# Patient Record
Sex: FEMALE | Race: OTHER | NOT HISPANIC OR LATINO
[De-identification: names, ages, dates, MRNs, and addresses within clinical notes are randomized per-mention and may not be internally consistent; named-entity substitution may affect disease eponyms.]

---

## 2016-12-30 NOTE — H&P ADULT. - ASSESSMENT
50 y/o female, with a PmHx of HTN, DM (on an Insulin Pump), HLD, TIA 7/16, Hypothyroidism, Endometrial Ca s/p Hysterectomy, is being admitted to telemetry for chest pain r/o acs.

## 2016-12-30 NOTE — H&P ADULT. - FAMILY HISTORY
Father  Still living? Yes, Estimated age: Age Unknown  Family history of diabetes mellitus, Age at diagnosis: Age Unknown     Mother  Still living? Yes, Estimated age: Age Unknown  Family history of diabetes mellitus, Age at diagnosis: Age Unknown  Family history of heart disease, Age at diagnosis: Age Unknown     Grandparent  Still living? No  Family history of heart disease, Age at diagnosis: Age Unknown     Uncle  Still living? Yes, Estimated age: Age Unknown  Family history of heart disease, Age at diagnosis: Age Unknown

## 2016-12-30 NOTE — ED PROVIDER NOTE - MEDICAL DECISION MAKING DETAILS
51y F presents with exertional chest pain, sob, and nausea. Multiple risk factors, consider unstable angina. Labs, CXR, and discuss with Dr. Gongora.

## 2016-12-30 NOTE — H&P ADULT. - NEGATIVE OPHTHALMOLOGIC SYMPTOMS
no loss of vision L/no diplopia/no blurred vision L/no blurred vision R/no loss of vision R/no photophobia

## 2016-12-30 NOTE — H&P ADULT. - HISTORY OF PRESENT ILLNESS
52 y/o female, with a PmHx of HTN, DM (on an Insulin Pump), HLD, TIA 7/16, Hypothyroidism, Endometrial Ca s/p Hysterectomy, presented to [facility] c/o chest pain. Pt states for the pain 3 weeks she has been having intermittent chest discomfort with associated symptoms of SOB, Cough, Dizziness and Nausea. Pt states 4 weeks ago she had the flu and 1 week after the flu started to go away she started to develop this intermittent chest discomfort. She state the pain would come and go but last night at about 2100hrs, she was getting ready to go to bed when she had a sudden onset of 4/10, "aching", non-radiating, left sided chest discomfort so she came today to the hospital for an evaluation. Pt states she had associated symptoms of SOB, HA, Dizziness and Nausea only when she would get the chest discomfort. Pt denies fever, chills, HA, blurred vision, emesis, abdominal pain, recent travel. Currently she is asymptomatic. Pt was concerned because back in 7/2016 she had a TIA. Pt thinks she had a stress test at an outpatient facility back in July 2-16. Pt appears comfortable a this time and is being admitted to telemetry for chest pain r/o acs.

## 2016-12-30 NOTE — ED PROVIDER NOTE - NS ED MD SCRIBE ATTENDING SCRIBE SECTIONS
HISTORY OF PRESENT ILLNESS/DISPOSITION/VITAL SIGNS( Pullset)/REVIEW OF SYSTEMS/PAST MEDICAL/SURGICAL/SOCIAL HISTORY/HIV/PHYSICAL EXAM

## 2016-12-30 NOTE — H&P ADULT. - RS GEN PE MLT RESP DETAILS PC
good air movement/breath sounds equal/respirations non-labored/chest wall tenderness/clear to auscultation bilaterally/airway patent

## 2016-12-30 NOTE — ED PROVIDER NOTE - DETAILS:
The scribe's documentation has been prepared under my direction and personally reviewed by me in its entirety. I confirm that the note above accurately reflects all work, treatment, procedures, and medical decision making performed by me.

## 2016-12-30 NOTE — ED PROVIDER NOTE - OBJECTIVE STATEMENT
51y F with PMHx of DM and HTN, TIA in July presents to the ED with intermittent chest discomfort with associated SOB, nausea, and dizziness x 4 weeks. Pt states chest discomfort became painful last night. Chest pain is worse with exertion and walking. Denies history of MI. FHx of MI mother. No smoking, no drinking, no drug use. NKDA. Medications: Lisinopril, Baby Aspirin, insulin. No recent travel. Past surgeries: 2004 hysterectomy. Pt describes previous TIA as left sided numbness. Denies current chest pain.

## 2016-12-30 NOTE — H&P ADULT. - PMH
DM (diabetes mellitus)    Endometrial cancer    HTN (hypertension)    Hyperlipidemia    Hypothyroid    TIA (transient ischemic attack)

## 2017-01-02 NOTE — DISCHARGE NOTE ADULT - MEDICATION SUMMARY - MEDICATIONS TO CHANGE
I will SWITCH the dose or number of times a day I take the medications listed below when I get home from the hospital:    Aspirin Enteric Coated 325 mg oral delayed release tablet  -- 1 tab(s) by mouth once a day MDD:1  -- Swallow whole.  Do not crush.  Take with food or milk.

## 2017-01-02 NOTE — DISCHARGE NOTE ADULT - PATIENT PORTAL LINK FT
“You can access the Follow[employer] Patient Portal, offered by [facility], by registering with the following website: [email or web address]”

## 2017-01-02 NOTE — DISCHARGE NOTE ADULT - MEDICATION SUMMARY - MEDICATIONS TO TAKE
I will START or STAY ON the medications listed below when I get home from the hospital:    Insulin Pump - Humalog  --     -- Indication: For DM (diabetes mellitus)    aspirin 81 mg oral tablet  -- 1 tab(s) by mouth once a day  -- Indication: For Cardiac protection    lisinopril 40 mg oral tablet  -- 1 tab(s) by mouth once a day  -- Indication: For HTN (hypertension)    Jentadueto 2.5 mg-1000 mg oral tablet  -- 1 tab(s) by mouth 2 times a day  -- Indication: For DM (diabetes mellitus)    atorvastatin 20 mg oral tablet  -- 1 tab(s) by mouth once a day  -- Indication: For Hyperlipidemia    Synthroid 200 mcg (0.2 mg) oral tablet  -- 1 tab(s) by mouth once a day  -- Indication: For Hypothyroid

## 2017-01-02 NOTE — DISCHARGE NOTE ADULT - CARE PLAN
Principal Discharge DX:	Atypical chest pain  Goal:	symptom resolution  Instructions for follow-up, activity and diet:	activity as tolerated  Secondary Diagnosis:	DM (diabetes mellitus)  Goal:	blood glucose control, management of Insulin pump  Instructions for follow-up, activity and diet:	Diabetic consistent carbohydrate diet, exercise program, Endocrinology followup  Secondary Diagnosis:	Hypothyroid  Secondary Diagnosis:	HTN (hypertension) Principal Discharge DX:	Atypical chest pain  Goal:	Follow up with primary doctor.  Instructions for follow-up, activity and diet:	Please follow up with Dr. Terrazas as outpatient. Information for Dr. Rubin provided below if you would like to follow up with his office as outpatient. Echocardiogram should be performed as outpatient.  Secondary Diagnosis:	DM (diabetes mellitus)  Goal:	blood glucose control, management of Insulin pump  Instructions for follow-up, activity and diet:	Continue diet modification. Avoid complex carbohydrates such as bread, pasta, cereal, white rice, white potatoes, etc. Avoid concentrated sugar as found in desserts, candy, soda, juice, etc. Consume a diet based on lean protein (chicken, fish) and vegetables.  Continue to use your insulin pump as advised. Follow up with the endocrinology clinic, [phone].  Secondary Diagnosis:	Hypothyroid  Instructions for follow-up, activity and diet:	Continue with synthroid.  Secondary Diagnosis:	HTN (hypertension)  Instructions for follow-up, activity and diet:	Continue medications as currently prescribed. Follow up with your PMD for further management of disease. Dash diet. Principal Discharge DX:	Atypical chest pain  Goal:	Follow up with primary doctor.  Instructions for follow-up, activity and diet:	Please follow up with Dr. Terrazas as outpatient. Information for Dr. Rubin provided below if you would like to follow up with his office as outpatient. Echocardiogram should be performed as outpatient.  Secondary Diagnosis:	DM (diabetes mellitus)  Goal:	blood glucose control, management of Insulin pump  Instructions for follow-up, activity and diet:	Continue diet modification. Avoid complex carbohydrates such as bread, pasta, cereal, white rice, white potatoes, etc. Avoid concentrated sugar as found in desserts, candy, soda, juice, etc. Consume a diet based on lean protein (chicken, fish) and vegetables.  Continue to use your insulin pump as advised. Follow up with the endocrinology clinic, [phone].  Secondary Diagnosis:	Hypothyroid  Instructions for follow-up, activity and diet:	Continue with synthroid.  Secondary Diagnosis:	HTN (hypertension)  Instructions for follow-up, activity and diet:	Continue medications as currently prescribed. Follow up with your PMD for further management of disease. Dash diet. Principal Discharge DX:	Atypical chest pain  Goal:	Follow up with primary doctor.  Instructions for follow-up, activity and diet:	Please follow up with Dr. Terrazas as outpatient. Information for Dr. Rubin provided below if you would like to follow up with his office as outpatient. Echocardiogram should be performed as outpatient.  Secondary Diagnosis:	DM (diabetes mellitus)  Goal:	blood glucose control, management of Insulin pump  Instructions for follow-up, activity and diet:	Continue diet modification. Avoid complex carbohydrates such as bread, pasta, cereal, white rice, white potatoes, etc. Avoid concentrated sugar as found in desserts, candy, soda, juice, etc. Consume a diet based on lean protein (chicken, fish) and vegetables.  Continue to use your insulin pump as advised. Follow up with the endocrinology clinic, [phone].  Secondary Diagnosis:	Hypothyroid  Instructions for follow-up, activity and diet:	Continue with synthroid.  Secondary Diagnosis:	HTN (hypertension)  Instructions for follow-up, activity and diet:	Continue medications as currently prescribed. Follow up with your PMD for further management of disease. Dash diet.

## 2017-01-02 NOTE — DISCHARGE NOTE ADULT - CARE PROVIDER_API CALL
Denis Terrazas (JOSE), Internal Medicine  [address] [place] [address]  Phone: [phone]  Fax: [phone] Denis Terrazas (JOSE), Internal Medicine  [address] [place] [address]  Phone: [phone]  Fax: [phone]    Bushra Bryson), EndocrinologyMetabDiabetes; Internal Medicine  [address] [place] [address]  Phone: [phone]  Fax: [phone]

## 2017-01-02 NOTE — DISCHARGE NOTE ADULT - HOSPITAL COURSE
EKG: NSR @ 99, no changes  CE x 2 neg               Glucose: 279               AST/ALT: 35/40  12/30 CXR - underinflated but otherwise clear lungs. No pleural effusions or pneumothorax. Cardiac and mediastinal silhouettes within normal limits. Trachea midline. Mild spinal degenerative changes. Unremarkable remaining visualized osseous structures.    12/30 Endo ([facility]): DM2: on insulin pump, HgbA1C pending would change settings , cont lipitor, check lipid profile, Htn: cont lisinopril, Hypothyroid: cont levothyroxine. Patient needs to follow-up outpatient with Endocrine [phone]. Can f/u at [address] [place] [address] in Endo Office  12/31- INsulin pump ^ 1.5 Unit / hour    1/1 - endo - uncontrolled DM. Raise basal to 1.7. Change ISF to 35 from 38  1/1 cardio - awaiting for echo and stress test  1/2 - HgbA1c : 9.4, TSH:  13.19 EKG: NSR @ 99, no changes  CE x 2 neg               Glucose: 279               AST/ALT: 35/40  12/30 CXR - underinflated but otherwise clear lungs. No pleural effusions or pneumothorax. Cardiac and mediastinal silhouettes within normal limits. Trachea midline. Mild spinal degenerative changes. Unremarkable remaining visualized osseous structures.    12/30 Endo ([facility]): DM2: on insulin pump, HgbA1C pending would change settings , cont lipitor, check lipid profile, Htn: cont lisinopril, Hypothyroid: cont levothyroxine. Patient needs to follow-up outpatient with Endocrine [phone]. Can f/u at [address] [place] [address] in Endo Office  12/31- INsulin pump ^ 1.5 Unit / hour    1/1 - endo - uncontrolled DM. Raise basal to 1.7. Change ISF to 35 from 38  1/1 cardio - awaiting for echo and stress test  1/2 - HgbA1c : 9.4, TSH:  13.19  1/2 - Exercise stress test:  probably normal study; attenuation artifact  1/2 CArdio: ST reviewed- echo likely as outpt  1/2 ENDO: uncontrolled DM - increase basal to 2.1U, monitor FS, c/w current management    Cleared for discharge. Outpatient echocardiogram and outpt follow up with endocrine.

## 2017-01-02 NOTE — DISCHARGE NOTE ADULT - CARE PROVIDERS DIRECT ADDRESSES
,DirectAddress_Unknown,DirectAddress_Unknown ,DirectAddress_Unknown,[email or web address],DirectAddress_Unknown

## 2017-01-02 NOTE — DISCHARGE NOTE ADULT - PLAN OF CARE
symptom resolution activity as tolerated blood glucose control, management of Insulin pump Diabetic consistent carbohydrate diet, exercise program, Endocrinology followup Continue medications as currently prescribed. Follow up with your PMD for further management of disease. Dash diet. Continue with synthroid. Follow up with primary doctor. Please follow up with Dr. Terrazas as outpatient. Information for Dr. Rubin provided below if you would like to follow up with his office as outpatient. Echocardiogram should be performed as outpatient. Continue diet modification. Avoid complex carbohydrates such as bread, pasta, cereal, white rice, white potatoes, etc. Avoid concentrated sugar as found in desserts, candy, soda, juice, etc. Consume a diet based on lean protein (chicken, fish) and vegetables.  Continue to use your insulin pump as advised. Follow up with the endocrinology clinic, [phone].

## 2017-03-16 ENCOUNTER — TRANSCRIPTION ENCOUNTER (OUTPATIENT)
Age: 52
End: 2017-03-16

## 2017-08-19 ENCOUNTER — EMERGENCY (EMERGENCY)
Facility: HOSPITAL | Age: 52
LOS: 1 days | Discharge: HOME | End: 2017-08-19
Attending: EMERGENCY MEDICINE | Admitting: EMERGENCY MEDICINE
Payer: COMMERCIAL

## 2017-08-19 VITALS
SYSTOLIC BLOOD PRESSURE: 182 MMHG | HEART RATE: 98 BPM | TEMPERATURE: 99 F | RESPIRATION RATE: 18 BRPM | DIASTOLIC BLOOD PRESSURE: 110 MMHG | OXYGEN SATURATION: 100 %

## 2017-08-19 VITALS — WEIGHT: 149.91 LBS

## 2017-08-19 DIAGNOSIS — Z90.710 ACQUIRED ABSENCE OF BOTH CERVIX AND UTERUS: Chronic | ICD-10-CM

## 2017-08-19 PROCEDURE — 99283 EMERGENCY DEPT VISIT LOW MDM: CPT

## 2017-08-19 RX ORDER — SODIUM CHLORIDE 9 MG/ML
3 INJECTION INTRAMUSCULAR; INTRAVENOUS; SUBCUTANEOUS ONCE
Qty: 0 | Refills: 0 | Status: DISCONTINUED | OUTPATIENT
Start: 2017-08-19 | End: 2017-08-19

## 2017-08-19 NOTE — ED PROVIDER NOTE - OBJECTIVE STATEMENT
53 y/o f with pmhx of htn, p/w htn appreciated at home. states bp was 200/100 at home with no tachycardia. pt denies any ha, cp, decreased urination. states took night time dose of metropolol. states became concerned and was checking bp every 20 minutes and noticed the number getting higher so she came in here. pt otherwise denies any symptoms. contrary to nursing triage note, denies tingling.

## 2017-08-19 NOTE — ED ADULT NURSE NOTE - OBJECTIVE STATEMENT
Dennys RN note: Pt st" My blood pressure has been climbing over the past few days...today it was really bad so I called 911...I took Valsartan 320 & then took metoprolol  50mg before I came in....I also felt a tingling in left side to lip...which is still there..feel dizziness alittle bit if I move to quickly. " Pt speech is clear, no facial droop, Antonio=strength. Denies cp, denies shortness of breath. MD Costa at bedside. No labs at this time. BP improving. Pt positioned for comfort, call bell in reach.Handoff to primary RN Mary to follow Dennys RN note: Pt st" My blood pressure has been climbing over the past few days...today it was really bad so I called 911...I took Valsartan 320 & then took metoprolol  50mg before I came in....I also felt a tingling in left side to lip...which is still there..feel dizziness alittle bit if I move to quickly. " Pt speech is clear, no facial droop, Antonio=strength. Denies cp, denies shortness of breath. MD Mejia at bedside. No labs at this time. BP improving. Pt positioned for comfort, call bell in reach.Handoff to primary RN Mary to follow

## 2017-08-19 NOTE — ED PROVIDER NOTE - MEDICAL DECISION MAKING DETAILS
pt p/w asymptomatic hypertension. improved with extra dose of htn meds. had lengthy conversation with pt to return if begins developing symptoms of cp, headache, dec urinary output.pt stable for d/c with f/u appt on monday with pmd

## 2017-08-19 NOTE — ED ADULT TRIAGE NOTE - CHIEF COMPLAINT QUOTE
pt BIBA from home, pt c/o high blood pressure at home x 3 days.  pt offers no complaints.  pt took 50mg metoprolol this am prior to arrival.  pt now reports tingling to lower lip

## 2018-02-06 ENCOUNTER — APPOINTMENT (OUTPATIENT)
Dept: ORTHOPEDIC SURGERY | Facility: CLINIC | Age: 53
End: 2018-02-06
Payer: COMMERCIAL

## 2018-02-06 VITALS — HEART RATE: 112 BPM | SYSTOLIC BLOOD PRESSURE: 135 MMHG | DIASTOLIC BLOOD PRESSURE: 79 MMHG

## 2018-02-06 VITALS — WEIGHT: 154 LBS | BODY MASS INDEX: 28.34 KG/M2 | HEIGHT: 62 IN

## 2018-02-06 DIAGNOSIS — S89.92XA UNSPECIFIED INJURY OF LEFT LOWER LEG, INITIAL ENCOUNTER: ICD-10-CM

## 2018-02-06 PROCEDURE — 99244 OFF/OP CNSLTJ NEW/EST MOD 40: CPT

## 2018-02-06 PROCEDURE — 73564 X-RAY EXAM KNEE 4 OR MORE: CPT | Mod: LT

## 2018-12-22 ENCOUNTER — APPOINTMENT (OUTPATIENT)
Dept: MAMMOGRAPHY | Facility: IMAGING CENTER | Age: 53
End: 2018-12-22

## 2022-01-04 ENCOUNTER — APPOINTMENT (OUTPATIENT)
Dept: VASCULAR SURGERY | Facility: CLINIC | Age: 57
End: 2022-01-04

## 2024-10-31 ENCOUNTER — OUTPATIENT (OUTPATIENT)
Dept: OUTPATIENT SERVICES | Facility: HOSPITAL | Age: 59
LOS: 1 days | End: 2024-10-31
Payer: COMMERCIAL

## 2024-10-31 VITALS
OXYGEN SATURATION: 99 % | TEMPERATURE: 98 F | WEIGHT: 158.73 LBS | SYSTOLIC BLOOD PRESSURE: 160 MMHG | DIASTOLIC BLOOD PRESSURE: 69 MMHG | HEART RATE: 66 BPM | HEIGHT: 62.5 IN | RESPIRATION RATE: 14 BRPM

## 2024-10-31 VITALS
SYSTOLIC BLOOD PRESSURE: 156 MMHG | DIASTOLIC BLOOD PRESSURE: 71 MMHG | OXYGEN SATURATION: 99 % | HEART RATE: 71 BPM | RESPIRATION RATE: 15 BRPM

## 2024-10-31 DIAGNOSIS — Z98.41 CATARACT EXTRACTION STATUS, RIGHT EYE: Chronic | ICD-10-CM

## 2024-10-31 DIAGNOSIS — H43.12 VITREOUS HEMORRHAGE, LEFT EYE: ICD-10-CM

## 2024-10-31 DIAGNOSIS — Z90.710 ACQUIRED ABSENCE OF BOTH CERVIX AND UTERUS: Chronic | ICD-10-CM

## 2024-10-31 DIAGNOSIS — Z95.1 PRESENCE OF AORTOCORONARY BYPASS GRAFT: Chronic | ICD-10-CM

## 2024-10-31 DIAGNOSIS — E11.3592 TYPE 2 DIABETES MELLITUS WITH PROLIFERATIVE DIABETIC RETINOPATHY WITHOUT MACULAR EDEMA, LEFT EYE: ICD-10-CM

## 2024-10-31 LAB — GLUCOSE BLDC GLUCOMTR-MCNC: 149 MG/DL — HIGH (ref 70–99)

## 2024-10-31 PROCEDURE — 67039 LASER TREATMENT OF RETINA: CPT | Mod: LT

## 2024-10-31 PROCEDURE — 67040 LASER TREATMENT OF RETINA: CPT | Mod: AS,LT

## 2024-10-31 PROCEDURE — C1889: CPT

## 2024-10-31 PROCEDURE — 82962 GLUCOSE BLOOD TEST: CPT

## 2024-10-31 DEVICE — LASER PROBE 23G CONSTELLATION: Type: IMPLANTABLE DEVICE | Site: LEFT | Status: FUNCTIONAL

## 2024-10-31 RX ORDER — METOPROLOL TARTRATE 50 MG
1 TABLET ORAL
Refills: 0 | DISCHARGE

## 2024-10-31 RX ORDER — CLOPIDOGREL 75 MG/1
1 TABLET ORAL
Refills: 0 | DISCHARGE

## 2024-10-31 RX ORDER — VALSARTAN 160 MG/1
1 TABLET ORAL
Refills: 0 | DISCHARGE

## 2024-10-31 RX ORDER — INSULIN LISPRO 100/ML
0 VIAL (ML) SUBCUTANEOUS
Refills: 0 | DISCHARGE

## 2024-10-31 RX ORDER — INSULIN GLARGINE,HUM.REC.ANLOG 100/ML
30 VIAL (ML) SUBCUTANEOUS
Refills: 0 | DISCHARGE

## 2024-10-31 RX ORDER — METFORMIN HYDROCHLORIDE 500 MG/1
2 TABLET, EXTENDED RELEASE ORAL
Refills: 0 | DISCHARGE

## 2024-10-31 RX ORDER — EMPAGLIFLOZIN 25 MG/1
1 TABLET, FILM COATED ORAL
Refills: 0 | DISCHARGE

## 2024-10-31 RX ORDER — GLUCAGON INJECTION, SOLUTION 1 MG/.2ML
1 INJECTION, SOLUTION SUBCUTANEOUS ONCE
Refills: 0 | Status: ACTIVE | OUTPATIENT
Start: 2024-10-31

## 2024-10-31 NOTE — ASU PATIENT PROFILE, ADULT - NSICDXPASTSURGICALHX_GEN_ALL_CORE_FT
PAST SURGICAL HISTORY:  H/O bilateral cataract extraction Intraocular lens implant (IOL)    History of hysterectomy 2004    Presence of aortocoronary bypass graft

## 2024-10-31 NOTE — ASU PATIENT PROFILE, ADULT - FALL HARM RISK - HARM RISK INTERVENTIONS
Communicate Risk of Fall with Harm to all staff/Reinforce activity limits and safety measures with patient and family/Review medications for side effects contributing to fall risk/Tailored Fall Risk Interventions/Visual Cue: Yellow wristband and red socks/Bed in lowest position, wheels locked, appropriate side rails in place/Call bell, personal items and telephone in reach/Instruct patient to call for assistance before getting out of bed or chair/Non-slip footwear when patient is out of bed/[place] to call system/Physically safe environment - no spills, clutter or unnecessary equipment/Purposeful Proactive Rounding/Room/bathroom lighting operational, light cord in reach

## 2024-10-31 NOTE — ASU PATIENT PROFILE, ADULT - NSICDXPASTMEDICALHX_GEN_ALL_CORE_FT
PAST MEDICAL HISTORY:  CAD (coronary artery disease)     DM (diabetes mellitus) Type 2    Endometrial cancer     HTN (hypertension)     Hyperlipidemia     Hypothyroid     TIA (transient ischemic attack)

## 2024-10-31 NOTE — ASU PATIENT PROFILE, ADULT - NS TRANSFER PATIENT BELONGINGS
Wireless Earbuds,Keys/Cell Phone/PDA (specify)/Electronic Device (specify)/Other belongings Wireless Earbuds ,Keys, $525/Cell Phone/PDA (specify)/Electronic Device (specify)/Other belongings

## 2024-10-31 NOTE — ASU PATIENT PROFILE, ADULT - VISION (WITH CORRECTIVE LENSES IF THE PATIENT USUALLY WEARS THEM):
Left eye blurry/Normal vision: sees adequately in most situations; can see medication labels, newsprint

## 2024-10-31 NOTE — ASU DISCHARGE PLAN (ADULT/PEDIATRIC) - NS MD DC FALL RISK RISK
For information on Fall & Injury Prevention, visit: [email or web address] OR  [email or web address] OR  https://www.cdc.gov/steadi/patient.html

## 2024-10-31 NOTE — ASU DISCHARGE PLAN (ADULT/PEDIATRIC) - FINANCIAL ASSISTANCE
[facility] provides services at a reduced cost to those who are determined to be eligible through [facility]’s financial assistance program. Information regarding [facility]’s financial assistance program can be found by going to [email or web address] or by calling [phone].

## 2024-10-31 NOTE — ASU DISCHARGE PLAN (ADULT/PEDIATRIC) - CARE PROVIDER_API CALL
Ha Delgado  Ophthalmology  [address] [place] [address], [address]  [place] [address]  Phone: [phone]  Fax: [phone]  Scheduled Appointment: 11/01/2024

## (undated) DEVICE — SUT VICRYL 7-0 12" TG140-8 DA

## (undated) DEVICE — CANNULA ALCON SOFT TIP 23G

## (undated) DEVICE — GLV 7 PROTEXIS (WHITE)

## (undated) DEVICE — SOL IRR BAL SALT + 500ML

## (undated) DEVICE — LENS VITRECTOMY FLAT

## (undated) DEVICE — DIATHERMY PROBE 25GA

## (undated) DEVICE — PACK CONSTELLATION POST 25G 20K

## (undated) DEVICE — VENODYNE/SCD SLEEVE CALF MEDIUM

## (undated) DEVICE — SCRAPER MEMBRANE 23G

## (undated) DEVICE — WARMING BLANKET LOWER ADULT

## (undated) DEVICE — DRAPE STERI-DRAPE INCISE 13X13"

## (undated) DEVICE — ILM FORCEP 23G

## (undated) DEVICE — FLEXLOOP CURVED SCRAPER MEMBRANE 25G

## (undated) DEVICE — SYE-LASER - CONSTELLATION MACHINE #2 1003466901X: Type: DURABLE MEDICAL EQUIPMENT

## (undated) DEVICE — MARKER OPTH STR VISIMARK VIO

## (undated) DEVICE — CONSTELLATION TOTAL PLUS PAK 23G

## (undated) DEVICE — ELCTR BIPOLAR CORD J&J 12FT DISP

## (undated) DEVICE — BACKFLUSH SOFT TIP 25G

## (undated) DEVICE — DRAPE MICROSCOPE RESIGHT

## (undated) DEVICE — PACK VITRECTOMY

## (undated) DEVICE — ILM FORCEP 25G

## (undated) DEVICE — CANNULA ALCON SOFT TIP 25G

## (undated) DEVICE — SOL BALANCE SALT 15ML